# Patient Record
(demographics unavailable — no encounter records)

---

## 2025-07-28 NOTE — REVIEW OF SYSTEMS
[Patient Intake Form Reviewed] : Patient intake form was reviewed [Nocturia] : nocturia [Urinary Frequency] : urinary frequency [Negative] : Heme/Lymph [Fever] : no fever [Chills] : no chills [Abdominal Pain] : no abdominal pain [Constipation] : no constipation [Diarrhea] : no diarrhea [FreeTextEntry2] : some tiredness  [FreeTextEntry7] : feels "bloated or full" after eating  [FreeTextEntry9] : chronic joint aches  [de-identified] : skin lesions on bilateral back (scapula area)

## 2025-07-28 NOTE — HISTORY OF PRESENT ILLNESS
[FreeTextEntry1] : Mr. Aguilar is a 50-year-old male who presents in consultation for consideration of radiation therapy.  He is accompanied by his wife for today's visit.   Diagnosis: Marginal Zone Lymphoma, involving dermis   HPI: Mr. Aguilar originally presented in 2019 with a rash on his back, trunk, and arms.  He saw a dermatologist at the time, and it is reported that a biopsy was done but was inconclusive. As per chart notes, the rash improved but never fully resolved. In November 2022 he saw Dr. Abraham (derm), and a biopsy was done of right upper back.  Pathology revealed low grade B cell Lymphoproliferative disorder, likely marginal zone lymphoma.  He was then seen by Dr. JAKI Wylie (hematology) in consult on 1/12/23.  PET scan was ordered which showed multiple hypermetabolic lymph nodes above and below the diaphragm as well as increased gastric uptake. Bone marrow biopsy showed no evidence of bone marrow involvement.  He also saw Dr. Espinoza (hematology) in February 2023 who recommended treatment with Rituximab.  Mr. Aguilar started on Rituximab with Dr. Wylie (hematology) in March 2023 and has continued on a maintenance dose every two months since June 2023.  He remained without recurrence of disease until June 2025.  On 6/20/25 he was seen by Dr. Wylie (hematology) and stated that he had noticed growths on his right arm/shoulder as well as his back (scapula).  As per patient he had a biopsy on 6/29/25 with Dr. Abraham (derm), report obtained.   6/29/25 - underwent LEFT Upper Back Biopsy with Dr. Abraham: Pathology - Cutaneous involvement by B-Cell Lymphoma of germinal center origin with Kappa restricted plasma cells (recurrent). Note: The histologic and immunostain findings are similar to those seen in the patient's previous biopsy. The differential diagnosis includes cutaneous systemic involvement by systemic follicular center lymphoma vs primary cutaneous follicle center lymphoma.   7/20/25 - PET scan: 1. Compared to 12/28/24 PET, stable exam. Deauville score 2. 2. Stable FDG avid thyroid nodules for which an outpatient thyroid ultrasound is recommended if not already performed. 3. Mild splenomegaly. Physiologic activity in the spleen.   7/28/25 - presents in consultation for discussion of radiation therapy options.  Mr. Aguilar is feeling well today and looks forward to next steps in his treatment. He has skin lesions on bilateral back, scapula region, raised, no drainage or bleeding.  He continues on Rituximab with Dr. Wylie.

## 2025-07-28 NOTE — VITALS
[Maximal Pain Intensity: 0/10] : 0/10 [Least Pain Intensity: 0/10] : 0/10 [NoTreatment Scheduled] : no treatment scheduled [90: Able to carry normal activity; minor signs or symptoms of disease.] : 90: Able to carry normal activity; minor signs or symptoms of disease.  [ECOG Performance Status: 0 - Fully active, able to carry on all pre-disease performance without restriction] : Performance Status: 0 - Fully active, able to carry on all pre-disease performance without restriction [Date: ____________] : Patient's last distress assessment performed on [unfilled]. [6 - Distress Level] : Distress Level: 6 [Referred Patient  to social work for follow-up] : Patient was referred to social work for follow-up